# Patient Record
Sex: MALE | Race: WHITE | NOT HISPANIC OR LATINO | Employment: OTHER | URBAN - METROPOLITAN AREA
[De-identification: names, ages, dates, MRNs, and addresses within clinical notes are randomized per-mention and may not be internally consistent; named-entity substitution may affect disease eponyms.]

---

## 2022-06-24 RX ORDER — IBUPROFEN AND FAMOTIDINE 26.6; 8 MG/1; MG/1
TABLET, FILM COATED ORAL
COMMUNITY
End: 2022-10-11

## 2022-10-17 PROBLEM — S20.212A CONTUSION OF RIB ON LEFT SIDE: Status: ACTIVE | Noted: 2022-10-17

## 2022-10-17 PROBLEM — S79.911A HIP INJURY, RIGHT, INITIAL ENCOUNTER: Status: ACTIVE | Noted: 2022-10-17

## 2022-11-08 PROBLEM — J45.909 ASTHMA: Status: ACTIVE | Noted: 2022-11-08

## 2022-11-08 PROBLEM — M54.50 ACUTE LOW BACK PAIN: Status: ACTIVE | Noted: 2022-11-08

## 2022-11-08 PROBLEM — M12.9 ARTHROPATHY: Status: ACTIVE | Noted: 2022-11-08

## 2022-11-08 PROBLEM — K21.9 GASTROESOPHAGEAL REFLUX DISEASE: Status: ACTIVE | Noted: 2022-11-08

## 2023-02-07 PROBLEM — M80.00XA OSTEOPOROSIS WITH CURRENT PATHOLOGICAL FRACTURE: Status: ACTIVE | Noted: 2023-02-07

## 2023-02-07 PROBLEM — E29.1 HYPOGONADISM IN MALE: Status: ACTIVE | Noted: 2023-02-07

## 2023-02-07 PROBLEM — E61.0 COPPER DEFICIENCY: Status: ACTIVE | Noted: 2023-02-07

## 2023-02-07 PROBLEM — D61.818 PANCYTOPENIA (HCC): Status: ACTIVE | Noted: 2023-02-07

## 2023-04-21 ENCOUNTER — NEW PATIENT (OUTPATIENT)
Dept: URBAN - METROPOLITAN AREA CLINIC 9 | Facility: CLINIC | Age: 43
End: 2023-04-21

## 2023-04-21 DIAGNOSIS — H04.123: ICD-10-CM

## 2023-04-21 DIAGNOSIS — H02.882: ICD-10-CM

## 2023-04-21 DIAGNOSIS — H47.10: ICD-10-CM

## 2023-04-21 PROCEDURE — 99204 OFFICE O/P NEW MOD 45 MIN: CPT

## 2023-04-21 PROCEDURE — 92015 DETERMINE REFRACTIVE STATE: CPT

## 2023-04-21 PROCEDURE — 92133 CPTRZD OPH DX IMG PST SGM ON: CPT

## 2023-04-21 ASSESSMENT — TONOMETRY
OS_IOP_MMHG: 9
OD_IOP_MMHG: 14

## 2023-04-21 ASSESSMENT — VISUAL ACUITY
OS_SC: 20/20-1
OD_SC: 20/20

## 2023-04-26 PROBLEM — G93.89 ENCEPHALOMALACIA ON IMAGING STUDY: Status: ACTIVE | Noted: 2023-04-26

## 2023-05-11 ENCOUNTER — TECH ONLY (OUTPATIENT)
Dept: URBAN - METROPOLITAN AREA CLINIC 9 | Facility: CLINIC | Age: 43
End: 2023-05-11

## 2023-05-11 DIAGNOSIS — H47.10: ICD-10-CM

## 2023-05-11 PROCEDURE — 92083 EXTENDED VISUAL FIELD XM: CPT

## 2023-05-19 ENCOUNTER — ESTABLISHED PATIENT (OUTPATIENT)
Dept: URBAN - METROPOLITAN AREA CLINIC 9 | Facility: CLINIC | Age: 43
End: 2023-05-19

## 2023-05-19 DIAGNOSIS — H47.10: ICD-10-CM

## 2023-05-19 DIAGNOSIS — H02.885: ICD-10-CM

## 2023-05-19 DIAGNOSIS — H04.123: ICD-10-CM

## 2023-05-19 PROCEDURE — 99213 OFFICE O/P EST LOW 20 MIN: CPT

## 2023-05-19 PROCEDURE — 92133 CPTRZD OPH DX IMG PST SGM ON: CPT

## 2023-05-19 ASSESSMENT — VISUAL ACUITY
OU_SC: 20/20
OS_SC: 20/20
OD_SC: 20/20

## 2023-05-19 ASSESSMENT — TONOMETRY
OS_IOP_MMHG: 10
OD_IOP_MMHG: 10

## 2023-11-30 ENCOUNTER — TECH ONLY (OUTPATIENT)
Facility: LOCATION | Age: 43
End: 2023-11-30

## 2023-11-30 DIAGNOSIS — H47.10: ICD-10-CM

## 2023-11-30 PROCEDURE — 92083 EXTENDED VISUAL FIELD XM: CPT

## 2023-12-04 ENCOUNTER — ESTABLISHED PATIENT (OUTPATIENT)
Facility: LOCATION | Age: 43
End: 2023-12-04

## 2023-12-04 DIAGNOSIS — H02.882: ICD-10-CM

## 2023-12-04 DIAGNOSIS — H04.123: ICD-10-CM

## 2023-12-04 DIAGNOSIS — H47.333: ICD-10-CM

## 2023-12-04 DIAGNOSIS — H02.885: ICD-10-CM

## 2023-12-04 PROCEDURE — 99213 OFFICE O/P EST LOW 20 MIN: CPT

## 2023-12-04 PROCEDURE — 92133 CPTRZD OPH DX IMG PST SGM ON: CPT

## 2023-12-04 ASSESSMENT — VISUAL ACUITY
OD_SC: 20/20
OS_SC: 20/20

## 2023-12-04 ASSESSMENT — TONOMETRY
OS_IOP_MMHG: 13
OD_IOP_MMHG: 12

## 2024-06-18 PROBLEM — S52.022A OLECRANON FRACTURE, LEFT, CLOSED, INITIAL ENCOUNTER: Status: ACTIVE | Noted: 2024-06-18

## 2024-06-19 PROBLEM — S52.022A: Status: ACTIVE | Noted: 2024-06-19

## 2024-08-30 PROBLEM — T84.84XA PAINFUL ORTHOPAEDIC HARDWARE (HCC): Status: ACTIVE | Noted: 2024-08-30

## 2024-11-21 NOTE — PROGRESS NOTES
DARRIUS Chris MD, FACE    [facility] ENDOCRINOLOGY   AND   THYROID NODULE CLINIC            Reason for visit: Mateo Turner is referred by Tyrone Brandon MD ([place], [place]) for the evaluation and management of \"low testosterone, osteoporosis\".    Mateo Turner, was evaluated through a synchronous (real-time) audio-video encounter. The patient (or guardian if applicable) is aware that this is a billable service, which includes applicable co-pays. This Virtual Visit was conducted with patient's (and/or legal guardian's) consent. Patient identification was verified, and a caregiver was present when appropriate.   The patient was located at Home: [address] [place] [id number]  Provider was located at Facility (Appt Dept): [address] [place] [address]  Confirm you are appropriately licensed, registered, or certified to deliver care in the state where the patient is located as indicated above. If you are not or unsure, please re-schedule the visit: Yes, I confirm.        --Aquiles Chris MD on 11/25/2024 at 11:19 AM    An electronic signature was used to authenticate this note.        ASSESSMENT AND PLAN:    1. Hypogonadotropic hypogonadism (HCC)  Mr. Turner has a history of hypogonadism related due to hypothalamic/pituitary insufficiency associated with excessive exercise and low body mass.  He has already been fully evaluated for this by Dr. Blue, an endocrinologist in [place], [place].  Mr. Turner indicates that he found me on the Internet and requested referral to me because Dr. Blue \"yelled\" at him.  He ended up being evaluated by Dr. Brandon, an online physician apparently based out of [place], [place], and he provided him with a referral to me today.  Unfortunately, I do not have access to a lot of his records, though I do agree with his apparent diagnosis from Dr. Blue.  I do not recommend

## 2024-11-25 ENCOUNTER — TELEMEDICINE (OUTPATIENT)
Dept: ENDOCRINOLOGY | Age: 44
End: 2024-11-25
Payer: COMMERCIAL

## 2024-11-25 DIAGNOSIS — M85.9 LOW BONE DENSITY FOR AGE: ICD-10-CM

## 2024-11-25 DIAGNOSIS — R63.6 UNDERWEIGHT: ICD-10-CM

## 2024-11-25 DIAGNOSIS — E23.0 HYPOGONADOTROPIC HYPOGONADISM (HCC): Primary | ICD-10-CM

## 2024-11-25 PROCEDURE — 99204 OFFICE O/P NEW MOD 45 MIN: CPT | Performed by: INTERNAL MEDICINE

## 2024-12-09 ENCOUNTER — COMPREHENSIVE EXAM (OUTPATIENT)
Age: 44
End: 2024-12-09

## 2024-12-09 DIAGNOSIS — H02.882: ICD-10-CM

## 2024-12-09 DIAGNOSIS — H04.123: ICD-10-CM

## 2024-12-09 DIAGNOSIS — H47.333: ICD-10-CM

## 2024-12-09 DIAGNOSIS — H02.885: ICD-10-CM

## 2024-12-09 PROCEDURE — 92133 CPTRZD OPH DX IMG PST SGM ON: CPT

## 2024-12-09 PROCEDURE — 99214 OFFICE O/P EST MOD 30 MIN: CPT

## 2025-02-25 DIAGNOSIS — E23.0 HYPOGONADOTROPIC HYPOGONADISM: ICD-10-CM
